# Patient Record
Sex: FEMALE | URBAN - METROPOLITAN AREA
[De-identification: names, ages, dates, MRNs, and addresses within clinical notes are randomized per-mention and may not be internally consistent; named-entity substitution may affect disease eponyms.]

---

## 2022-04-28 ENCOUNTER — WEB ENCOUNTER (OUTPATIENT)
Dept: URBAN - METROPOLITAN AREA CLINIC 35 | Facility: CLINIC | Age: 77
End: 2022-04-28

## 2022-05-03 ENCOUNTER — OFFICE VISIT (OUTPATIENT)
Dept: URBAN - METROPOLITAN AREA CLINIC 35 | Facility: CLINIC | Age: 77
End: 2022-05-03
Payer: MEDICARE

## 2022-05-03 ENCOUNTER — LAB OUTSIDE AN ENCOUNTER (OUTPATIENT)
Dept: URBAN - METROPOLITAN AREA CLINIC 35 | Facility: CLINIC | Age: 77
End: 2022-05-03

## 2022-05-03 VITALS
SYSTOLIC BLOOD PRESSURE: 142 MMHG | HEIGHT: 65 IN | DIASTOLIC BLOOD PRESSURE: 78 MMHG | HEART RATE: 75 BPM | OXYGEN SATURATION: 98 % | BODY MASS INDEX: 22.33 KG/M2 | WEIGHT: 134 LBS

## 2022-05-03 DIAGNOSIS — Z86.010 PERSONAL HISTORY OF COLONIC POLYPS: ICD-10-CM

## 2022-05-03 DIAGNOSIS — K59.04 CHRONIC IDIOPATHIC CONSTIPATION: ICD-10-CM

## 2022-05-03 PROCEDURE — 99204 OFFICE O/P NEW MOD 45 MIN: CPT | Performed by: INTERNAL MEDICINE

## 2022-05-03 RX ORDER — SODIUM PICOSULFATE, MAGNESIUM OXIDE, AND ANHYDROUS CITRIC ACID 10; 3.5; 12 MG/160ML; G/160ML; G/160ML
160 ML LIQUID ORAL
Qty: 320 MILLILITER | Refills: 0 | OUTPATIENT
Start: 2022-05-03 | End: 2022-05-05

## 2022-05-03 RX ORDER — SOD SULF/POT CHLORIDE/MAG SULF 1.479 G
AS DIRECTED TABLET ORAL
Qty: 24 TABLET | Refills: 0 | OUTPATIENT
Start: 2022-05-03 | End: 2022-05-05

## 2022-05-03 RX ORDER — MULTIVITAMIN
1 TABLET TABLET ORAL ONCE A DAY
Status: ACTIVE | COMMUNITY

## 2022-05-03 RX ORDER — DOCUSATE SODIUM 100 MG/1
2 CAPSULES CAPSULE, LIQUID FILLED ORAL ONCE A DAY
Qty: 60 CAPSULE | Refills: 6 | OUTPATIENT
Start: 2022-05-03 | End: 2022-11-28

## 2022-05-03 NOTE — HPI-CONSTIPATION
Patient is here for an initial consultation due to chronic constipation Onset of symptoms:  years ago Patient was previously having 1 BM, 3 times a week Patient recently started taking Probiotic gummy daily for about 6 months and bowel movements have been more frequently lately. Current BM:  1 BM daily but reports type 1 and 2 stools on BS. Patient denies rectal bleeding, blood in stool or melena

## 2022-06-03 ENCOUNTER — CLAIMS CREATED FROM THE CLAIM WINDOW (OUTPATIENT)
Dept: URBAN - METROPOLITAN AREA CLINIC 4 | Facility: CLINIC | Age: 77
End: 2022-06-03
Payer: MEDICARE

## 2022-06-03 ENCOUNTER — OFFICE VISIT (OUTPATIENT)
Dept: URBAN - METROPOLITAN AREA SURGERY CENTER 8 | Facility: SURGERY CENTER | Age: 77
End: 2022-06-03
Payer: MEDICARE

## 2022-06-03 DIAGNOSIS — D12.2 ADENOMA OF ASCENDING COLON: ICD-10-CM

## 2022-06-03 DIAGNOSIS — D12.3 ADENOMA OF TRANSVERSE COLON: ICD-10-CM

## 2022-06-03 DIAGNOSIS — K63.89 OTHER SPECIFIED DISEASES OF INTESTINE: ICD-10-CM

## 2022-06-03 DIAGNOSIS — D12.2 BENIGN NEOPLASM OF ASCENDING COLON: ICD-10-CM

## 2022-06-03 DIAGNOSIS — D12.0 BENIGN NEOPLASM OF CECUM: ICD-10-CM

## 2022-06-03 DIAGNOSIS — D12.3 BENIGN NEOPLASM OF TRANSVERSE COLON: ICD-10-CM

## 2022-06-03 DIAGNOSIS — K63.89 BACTERIAL OVERGROWTH SYNDROME: ICD-10-CM

## 2022-06-03 DIAGNOSIS — Z86.010 ADENOMAS PERSONAL HISTORY OF COLONIC POLYPS: ICD-10-CM

## 2022-06-03 PROCEDURE — 45385 COLONOSCOPY W/LESION REMOVAL: CPT | Performed by: INTERNAL MEDICINE

## 2022-06-03 PROCEDURE — 45380 COLONOSCOPY AND BIOPSY: CPT | Performed by: INTERNAL MEDICINE

## 2022-06-03 PROCEDURE — 88305 TISSUE EXAM BY PATHOLOGIST: CPT | Performed by: PATHOLOGY

## 2022-06-03 PROCEDURE — G8907 PT DOC NO EVENTS ON DISCHARG: HCPCS | Performed by: INTERNAL MEDICINE

## 2022-07-05 ENCOUNTER — OFFICE VISIT (OUTPATIENT)
Dept: URBAN - METROPOLITAN AREA CLINIC 35 | Facility: CLINIC | Age: 77
End: 2022-07-05
Payer: MEDICARE

## 2022-07-05 ENCOUNTER — DASHBOARD ENCOUNTERS (OUTPATIENT)
Age: 77
End: 2022-07-05

## 2022-07-05 VITALS
HEIGHT: 65 IN | BODY MASS INDEX: 22.33 KG/M2 | OXYGEN SATURATION: 99 % | SYSTOLIC BLOOD PRESSURE: 158 MMHG | DIASTOLIC BLOOD PRESSURE: 74 MMHG | WEIGHT: 134 LBS | HEART RATE: 73 BPM

## 2022-07-05 DIAGNOSIS — D12.8 BENIGN NEOPLASM OF RECTUM: ICD-10-CM

## 2022-07-05 DIAGNOSIS — Z86.010 PERSONAL HISTORY OF COLONIC POLYPS: ICD-10-CM

## 2022-07-05 DIAGNOSIS — K59.04 CHRONIC IDIOPATHIC CONSTIPATION: ICD-10-CM

## 2022-07-05 DIAGNOSIS — D12.2 BENIGN NEOPLASM OF ASCENDING COLON: ICD-10-CM

## 2022-07-05 DIAGNOSIS — D12.3 BENIGN NEOPLASM OF TRANSVERSE COLON: ICD-10-CM

## 2022-07-05 DIAGNOSIS — D12.6 TUBULAR ADENOMA OF COLON: ICD-10-CM

## 2022-07-05 PROBLEM — 444898006: Status: ACTIVE | Noted: 2022-07-05

## 2022-07-05 PROBLEM — 428283002 HISTORY OF POLYP OF COLON (SITUATION): Status: ACTIVE | Noted: 2022-04-28

## 2022-07-05 PROBLEM — 92448001 BENIGN NEOPLASM OF TRANSVERSE COLON: Status: ACTIVE | Noted: 2022-07-05

## 2022-07-05 PROBLEM — 92318000 BENIGN NEOPLASM OF RECTUM: Status: ACTIVE | Noted: 2022-07-05

## 2022-07-05 PROBLEM — 82934008: Status: ACTIVE | Noted: 2022-05-03

## 2022-07-05 PROBLEM — 91985000 BENIGN NEOPLASM OF ASCENDING COLON: Status: ACTIVE | Noted: 2022-07-05

## 2022-07-05 PROCEDURE — 99213 OFFICE O/P EST LOW 20 MIN: CPT | Performed by: INTERNAL MEDICINE

## 2022-07-05 RX ORDER — DOCUSATE SODIUM 100 MG/1
2 CAPSULES CAPSULE, LIQUID FILLED ORAL ONCE A DAY
Qty: 60 CAPSULE | Refills: 6 | Status: ACTIVE | COMMUNITY
Start: 2022-05-03 | End: 2022-11-28

## 2022-07-05 RX ORDER — MULTIVITAMIN
1 TABLET TABLET ORAL ONCE A DAY
Status: ACTIVE | COMMUNITY

## 2022-07-05 RX ORDER — DOCUSATE SODIUM 100 MG/1
4 CAPSULES CAPSULE, LIQUID FILLED ORAL ONCE A DAY
Qty: 120 CAPSULE | Refills: 6 | OUTPATIENT

## 2022-07-05 NOTE — HPI-CONSTIPATION
Patient is here for a routine follow up for Constipation. Last OV was 2 months ago Last OV patient report chronic constipation for several years with 1 BM three times a week.  Patient started taking a Probiotic gummy daily but had 1 BM daily with Type 1 and 2 stools on BS.  Kirkn continues taking Docusate 200 mg daily Current BM:  Patient reports for the past 2 days she has had 1 BM daily with type 4 stools on BS scale  After Colonoscopyshe had1 BM once a week

## 2023-02-16 ENCOUNTER — WEB ENCOUNTER (OUTPATIENT)
Dept: URBAN - METROPOLITAN AREA CLINIC 35 | Facility: CLINIC | Age: 78
End: 2023-02-16